# Patient Record
Sex: FEMALE | Race: WHITE | NOT HISPANIC OR LATINO | Employment: STUDENT | URBAN - METROPOLITAN AREA
[De-identification: names, ages, dates, MRNs, and addresses within clinical notes are randomized per-mention and may not be internally consistent; named-entity substitution may affect disease eponyms.]

---
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
## 2025-07-17 ENCOUNTER — OFFICE VISIT (OUTPATIENT)
Dept: OBSTETRICS AND GYNECOLOGY | Facility: CLINIC | Age: 20
End: 2025-07-17
Payer: COMMERCIAL

## 2025-07-17 VITALS
HEART RATE: 112 BPM | WEIGHT: 111.38 LBS | BODY MASS INDEX: 20.5 KG/M2 | DIASTOLIC BLOOD PRESSURE: 66 MMHG | HEIGHT: 62 IN | SYSTOLIC BLOOD PRESSURE: 103 MMHG

## 2025-07-17 DIAGNOSIS — Z30.9 ENCOUNTER FOR CONTRACEPTIVE MANAGEMENT, UNSPECIFIED TYPE: ICD-10-CM

## 2025-07-17 DIAGNOSIS — Z01.419 WELL WOMAN EXAM WITH ROUTINE GYNECOLOGICAL EXAM: Primary | ICD-10-CM

## 2025-07-17 DIAGNOSIS — Z11.3 ENCOUNTER FOR SCREENING FOR INFECTIONS WITH PREDOMINANTLY SEXUAL MODE OF TRANSMISSION: ICD-10-CM

## 2025-07-17 LAB
B-HCG UR QL: NEGATIVE
CTP QC/QA: YES

## 2025-07-17 RX ORDER — ETONOGESTREL AND ETHINYL ESTRADIOL VAGINAL RING .015; .12 MG/D; MG/D
1 RING VAGINAL
Qty: 1 EACH | Refills: 11 | Status: SHIPPED | OUTPATIENT
Start: 2025-07-17 | End: 2026-07-17

## 2025-07-17 NOTE — PROGRESS NOTES
"  Subjective:      Patient ID: Dana Pressley is a 19 y.o. female who presents for evaluation today.    Chief Complaint:    Well Woman      History of Present Illness  HPI  Annual Exam (Premenopausal) - Patient presents for annual exam. The patient has no complaints today. The patient is sexually active. GYN screening history: no prior history of gyn screening tests. The patient wears seatbelts: yes. The patient participates in regular exercise: not asked. Has the patient ever been transfused or tattooed?: not asked. The patient reports that there is not domestic violence in her life. She reports regular periods. She is not on her OCP as she could not remember. Using condoms. She would like to try nuvaring.     GYN History  Patient's last menstrual period was 07/08/2025 (exact date).   Date of Last Pap: N/A    VITALS  /66 (Patient Position: Sitting)   Pulse (!) 112   Ht 5' 2" (1.575 m)   Wt 50.5 kg (111 lb 6.4 oz)   LMP 07/08/2025 (Exact Date)   BMI 20.38 kg/m²   Weight: 50.5 kg (111 lb 6.4 oz)   Height: 5' 2" (157.5 cm)     PAST MEDICAL HISTORY  Past Medical History:   Diagnosis Date    History of chlamydia     Menorrhagia with regular cycle        PAST SURGICAL HISTORY  Past Surgical History:   Procedure Laterality Date    FACIAL COSMETIC SURGERY         SOCIAL HISTORY  Tobacco Use History[1],   Social History     Substance and Sexual Activity   Alcohol Use Never        MEDICATIONS  No outpatient medications have been marked as taking for the 7/17/25 encounter (Office Visit) with Adali Fonseca NP.         Review of Systems   Review of Systems   Constitutional:  Negative for activity change.   Eyes:  Negative for visual disturbance.   Respiratory:  Negative for shortness of breath.    Cardiovascular:  Negative for chest pain.   Gastrointestinal:  Negative for abdominal pain.   Genitourinary:  Negative for vaginal bleeding.        No abnormal vaginal bleeding   Musculoskeletal:  Negative for back " pain.   Integumentary:  Negative for rash and breast mass.   Neurological:  Negative for numbness.   Psychiatric/Behavioral:          No mood disturbance or changes    Breast: Negative for mass          Objective:     Physical Exam:   Constitutional: She is oriented to person, place, and time. Vital signs are normal. No distress.      Neck: No thyromegaly present.    Cardiovascular:  Normal rate and regular rhythm.             Pulmonary/Chest: Effort normal and breath sounds normal. No respiratory distress.        Abdominal: Soft. She exhibits no distension. There is no abdominal tenderness.     Genitourinary:    Vagina and uterus normal.      Pelvic exam was performed with patient in the lithotomy position.   The external female genitalia was normal.     Labial bartholins normal.There is no rash or tenderness on the right labia. There is no rash or tenderness on the left labia. Cervix is normal. Right adnexum displays no tenderness and no fullness. Left adnexum displays no tenderness and no fullness. No vaginal discharge or bleeding in the vagina. Cervix exhibits no discharge and no friability. Uterus is not enlarged.           Musculoskeletal: Moves all extremeties.       Neurological: She is alert and oriented to person, place, and time.    Skin: Skin is warm and dry.    Psychiatric: She has a normal mood and affect. Her behavior is normal.          Assessment:     Well woman exam with routine gynecological exam    Encounter for contraceptive management, unspecified type  -     etonogestreL-ethinyl estradioL (NUVARING) 0.12-0.015 mg/24 hr vaginal ring; Place 1 each vaginally every 21 days. Insert one (1) ring vaginally and leave in place for three (3) weeks, then remove for one (1) week.  Dispense: 1 each; Refill: 11    Encounter for screening for infections with predominantly sexual mode of transmission  -     C. trachomatis/N. gonorrhoeae by AMP DNA Other; Cervicovaginal         Plan:     Patient instructed to  contact the clinic should any questions or concerns arise prior to her next office visit. Patient is happy with the plan of care at this time, verbalizes understanding and denies outstanding questions.      Self-breast exams  Birth Control discussed  If you don't hear from the office regarding results within 1 week, please call  Follow up in 1 year for annual or sooner as needed  Chaperone present for exam   Female chaperone present.          [1]   Social History  Tobacco Use   Smoking Status Every Day    Types: Vaping with nicotine   Smokeless Tobacco Never